# Patient Record
Sex: FEMALE | Race: WHITE | NOT HISPANIC OR LATINO | URBAN - METROPOLITAN AREA
[De-identification: names, ages, dates, MRNs, and addresses within clinical notes are randomized per-mention and may not be internally consistent; named-entity substitution may affect disease eponyms.]

---

## 2019-04-09 ENCOUNTER — OFFICE VISIT (OUTPATIENT)
Dept: FAMILY MEDICINE | Facility: CLINIC | Age: 29
End: 2019-04-09
Payer: COMMERCIAL

## 2019-04-09 VITALS
WEIGHT: 293 LBS | SYSTOLIC BLOOD PRESSURE: 119 MMHG | TEMPERATURE: 97.3 F | HEIGHT: 69 IN | OXYGEN SATURATION: 97 % | HEART RATE: 104 BPM | DIASTOLIC BLOOD PRESSURE: 85 MMHG | BODY MASS INDEX: 43.4 KG/M2

## 2019-04-09 DIAGNOSIS — Z12.4 CERVICAL CANCER SCREENING: ICD-10-CM

## 2019-04-09 DIAGNOSIS — R73.01 ELEVATED FASTING GLUCOSE: ICD-10-CM

## 2019-04-09 DIAGNOSIS — Z30.8 ENCOUNTER FOR OTHER CONTRACEPTIVE MANAGEMENT: Primary | ICD-10-CM

## 2019-04-09 DIAGNOSIS — E66.01 MORBID OBESITY (H): ICD-10-CM

## 2019-04-09 DIAGNOSIS — Z00.00 HEALTHCARE MAINTENANCE: ICD-10-CM

## 2019-04-09 DIAGNOSIS — F34.1 DYSTHYMIA: ICD-10-CM

## 2019-04-09 DIAGNOSIS — Z11.3 SCREEN FOR STD (SEXUALLY TRANSMITTED DISEASE): ICD-10-CM

## 2019-04-09 DIAGNOSIS — R00.0 TACHYCARDIA: ICD-10-CM

## 2019-04-09 LAB
ANION GAP SERPL CALCULATED.3IONS-SCNC: 8 MMOL/L (ref 3–14)
BUN SERPL-MCNC: 10 MG/DL (ref 7–30)
CALCIUM SERPL-MCNC: 9.2 MG/DL (ref 8.5–10.1)
CHLORIDE SERPL-SCNC: 107 MMOL/L (ref 94–109)
CHOLEST SERPL-MCNC: 184 MG/DL
CO2 SERPL-SCNC: 24 MMOL/L (ref 20–32)
CREAT SERPL-MCNC: 0.77 MG/DL (ref 0.52–1.04)
ERYTHROCYTE [DISTWIDTH] IN BLOOD BY AUTOMATED COUNT: 13.2 % (ref 10–15)
GFR SERPL CREATININE-BSD FRML MDRD: >90 ML/MIN/{1.73_M2}
GLUCOSE SERPL-MCNC: 127 MG/DL (ref 70–99)
HBA1C MFR BLD: 5.9 % (ref 0–5.6)
HCT VFR BLD AUTO: 43.5 % (ref 35–47)
HDLC SERPL-MCNC: 39 MG/DL
HGB BLD-MCNC: 14.3 G/DL (ref 11.7–15.7)
LDLC SERPL CALC-MCNC: 110 MG/DL
MCH RBC QN AUTO: 29.9 PG (ref 26.5–33)
MCHC RBC AUTO-ENTMCNC: 32.9 G/DL (ref 31.5–36.5)
MCV RBC AUTO: 91 FL (ref 78–100)
NONHDLC SERPL-MCNC: 145 MG/DL
PLATELET # BLD AUTO: 318 10E9/L (ref 150–450)
POTASSIUM SERPL-SCNC: 4.2 MMOL/L (ref 3.4–5.3)
RBC # BLD AUTO: 4.79 10E12/L (ref 3.8–5.2)
SODIUM SERPL-SCNC: 139 MMOL/L (ref 133–144)
TRIGL SERPL-MCNC: 177 MG/DL
TSH SERPL DL<=0.005 MIU/L-ACNC: 2.44 MU/L (ref 0.4–4)
WBC # BLD AUTO: 14.9 10E9/L (ref 4–11)

## 2019-04-09 PROCEDURE — G0145 SCR C/V CYTO,THINLAYER,RESCR: HCPCS | Performed by: NURSE PRACTITIONER

## 2019-04-09 PROCEDURE — 83036 HEMOGLOBIN GLYCOSYLATED A1C: CPT | Performed by: NURSE PRACTITIONER

## 2019-04-09 PROCEDURE — 36415 COLL VENOUS BLD VENIPUNCTURE: CPT | Performed by: NURSE PRACTITIONER

## 2019-04-09 PROCEDURE — 87491 CHLMYD TRACH DNA AMP PROBE: CPT | Performed by: NURSE PRACTITIONER

## 2019-04-09 PROCEDURE — 84443 ASSAY THYROID STIM HORMONE: CPT | Performed by: NURSE PRACTITIONER

## 2019-04-09 PROCEDURE — 80048 BASIC METABOLIC PNL TOTAL CA: CPT | Performed by: NURSE PRACTITIONER

## 2019-04-09 PROCEDURE — G0124 SCREEN C/V THIN LAYER BY MD: HCPCS | Performed by: NURSE PRACTITIONER

## 2019-04-09 PROCEDURE — 0064U ANTB TP TOTAL&RPR IA QUAL: CPT | Performed by: NURSE PRACTITIONER

## 2019-04-09 PROCEDURE — 87591 N.GONORRHOEAE DNA AMP PROB: CPT | Performed by: NURSE PRACTITIONER

## 2019-04-09 PROCEDURE — 80061 LIPID PANEL: CPT | Performed by: NURSE PRACTITIONER

## 2019-04-09 PROCEDURE — 85027 COMPLETE CBC AUTOMATED: CPT | Performed by: NURSE PRACTITIONER

## 2019-04-09 PROCEDURE — 87389 HIV-1 AG W/HIV-1&-2 AB AG IA: CPT | Performed by: NURSE PRACTITIONER

## 2019-04-09 PROCEDURE — 99203 OFFICE O/P NEW LOW 30 MIN: CPT | Performed by: NURSE PRACTITIONER

## 2019-04-09 ASSESSMENT — PAIN SCALES - GENERAL: PAINLEVEL: NO PAIN (0)

## 2019-04-09 ASSESSMENT — MIFFLIN-ST. JEOR: SCORE: 2288.31

## 2019-04-09 NOTE — LETTER
[facility]  [address] [place]  [address]  [phone]        April 11, 2019    Ricardo Sapp  [address] [place] [address]        Dear Ricardo,    The results of your recent labs are enclosed.   Your fasting blood sugar was elevated so I checked your A1c which gives us an average measure of your blood sugar over the past three months. This was elevated too consistent with pre-diabetes. It is important to avoid processed sugars, foods high in glycemic index and saturated fats and get regular exercise to reduce your risk of developing diabetes. We could start a medication called metformin which helps with insulin resistant. This medication can help reduce your risk of developing diabetes. Please let me know if this is something you're interested in.   STD screening was negative.   Thyroid function is within normal range.     Please call the clinic if you have any concerns.     Results for orders placed or performed in visit on 04/09/19   Basic metabolic panel   Result Value Ref Range    Sodium 139 133 - 144 mmol/L    Potassium 4.2 3.4 - 5.3 mmol/L    Chloride 107 94 - 109 mmol/L    Carbon Dioxide 24 20 - 32 mmol/L    Anion Gap 8 3 - 14 mmol/L    Glucose 127 (H) 70 - 99 mg/dL    Urea Nitrogen 10 7 - 30 mg/dL    Creatinine 0.77 0.52 - 1.04 mg/dL    GFR Estimate >90 >60 mL/min/[1.73_m2]    GFR Estimate If Black >90 >60 mL/min/[1.73_m2]    Calcium 9.2 8.5 - 10.1 mg/dL   Lipid panel reflex to direct LDL Fasting   Result Value Ref Range    Cholesterol 184 <200 mg/dL    Triglycerides 177 (H) <150 mg/dL    HDL Cholesterol 39 (L) >49 mg/dL    LDL Cholesterol Calculated 110 (H) <100 mg/dL    Non HDL Cholesterol 145 (H) <130 mg/dL   TSH with free T4 reflex   Result Value Ref Range    TSH 2.44 0.40 - 4.00 mU/L   HIV Antigen Antibody Combo   Result Value Ref Range    HIV Antigen Antibody Combo Nonreactive NR^Nonreactive       Treponema Abs w Reflex to RPR and Titer    Result Value Ref Range    Treponema Antibodies Nonreactive NR^Nonreactive   CBC with platelets   Result Value Ref Range    WBC 14.9 (H) 4.0 - 11.0 10e9/L    RBC Count 4.79 3.8 - 5.2 10e12/L    Hemoglobin 14.3 11.7 - 15.7 g/dL    Hematocrit 43.5 35.0 - 47.0 %    MCV 91 78 - 100 fl    MCH 29.9 26.5 - 33.0 pg    MCHC 32.9 31.5 - 36.5 g/dL    RDW 13.2 10.0 - 15.0 %    Platelet Count 318 150 - 450 10e9/L   Hemoglobin A1c   Result Value Ref Range    Hemoglobin A1C 5.9 (H) 0 - 5.6 %   NEISSERIA GONORRHOEA PCR   Result Value Ref Range    Specimen Descrip Endocervical     N Gonorrhea PCR Negative NEG^Negative   CHLAMYDIA TRACHOMATIS PCR   Result Value Ref Range    Specimen Description Endocervical     Chlamydia Trachomatis PCR Negative NEG^Negative       If you have any questions please call the clinic at [phone].    Sincerely,    Radha CROW CNP  LMD

## 2019-04-09 NOTE — PATIENT INSTRUCTIONS
You can schedule with our midwives to have your IUD removed  They are here on Thursdays  If Thursdays don't work for you, you can see OB/GYN or midwives at [facility]

## 2019-04-09 NOTE — PROGRESS NOTES
"  SUBJECTIVE:                                                    Ricardo Sapp is a 28 year old female who presents to clinic today for the following health issues:      Patient is here today to discuss birth control options, currently she has an IUD in that needs to come out soon.    She recently moved from [facility] IUD inserted May 2016  She was  3 years ago  Might be interested in getting pregnant within next 1-2 years  Not getting period on IUD  Light, occasional spotting, more recently    Was on Trudy in the past    Seeing a therapist for depression  Working well  Hx dysthymia    Denies other PMH  No medications  Declines STD screening        Problem list and histories reviewed & adjusted, as indicated.  Additional history: as documented    Patient Active Problem List   Diagnosis     overweight HCC     Dysthymia     History reviewed. No pertinent surgical history.    Social History     Tobacco Use     Smoking status: Never Smoker     Smokeless tobacco: Never Used   Substance Use Topics     Alcohol use: Not Currently     Family History   Problem Relation Age of Onset     Hypertension Mother      Diabetes Father      Hypertension Father            ROS:  Constitutional, HEENT, cardiovascular, pulmonary, gi and gu systems are negative, except as otherwise noted.    OBJECTIVE:     /85 (BP Location: Right arm, Patient Position: Chair, Cuff Size: Adult Large)   Pulse 104   Temp 97.3  F (36.3  C) (Oral)   Ht 1.74 m (5' 8.5\")   Wt 149.7 kg (330 lb)   SpO2 97%   Breastfeeding? No   BMI 49.45 kg/m    Body mass index is 49.45 kg/m .  GENERAL: healthy, alert and no distress  NECK: no adenopathy, no asymmetry, masses, or scars and thyroid normal to palpation  RESP: lungs clear to auscultation - no rales, rhonchi or wheezes  CV: regular rate and rhythm, normal S1 S2, no S3 or S4, no murmur, click or rub, no peripheral edema and peripheral pulses strong  ABDOMEN: soft, nontender, no " hepatosplenomegaly, no masses and bowel sounds normal   (female): normal female external genitalia, normal urethral meatus, vaginal mucosa, normal cervix with IUD strings visualized protruding from os    Diagnostic Test Results:  none     ASSESSMENT/PLAN:       ICD-10-CM    1. Encounter for other contraceptive management Z30.8    2. overweight HCC E66.01    3. Screen for STD (sexually transmitted disease) Z11.3 NEISSERIA GONORRHOEA PCR     CHLAMYDIA TRACHOMATIS PCR     HIV Antigen Antibody Combo     Treponema Abs w Reflex to RPR and Titer   4. Tachycardia R00.0 TSH with free T4 reflex     CBC with platelets   5. Cervical cancer screening Z12.4 Pap imaged thin layer screen reflex to HPV if ASCUS - recommend age 25 - 29   6. Healthcare maintenance Z00.00 Basic metabolic panel     Lipid panel reflex to direct LDL Fasting   7. Dysthymia F34.1      Discussed alternative options for birth control. She is interested in COCs. Would recommend a lower dose hormone than Trudy. She will schedule with midwife for IUD removal next month  Baseline labs done today  Discussed dietary and exercise guidelines      Patient Instructions   You can schedule with our midwives to have your IUD removed  They are here on Thursdays  If Thursdays don't work for you, you can see OB/GYN or midwives at [place] on [address]        TRISTIN Yao [facility]

## 2019-04-09 NOTE — LETTER
April 24, 2019      Ricardo Sapp  [address] [facility] [address]    Dear ,      I am happy to inform you that your recent cervical cancer screening test (PAP smear) was normal.      The presence of Actinomyces was seen on your pap smear. Actinomyces is present in normal vaginal jb. It is fairly common in women with a IUD, as it likes to grow on the strings of the IUD.  As long as you are not having any discomfort, pelvic pain, discharge, abdominal pain, fever, we are okay to monitor. If you develop any of these symptoms, please call to  schedule an appt with an OBGYN provider for further evaluation.    Preventative screenings such as this help to ensure your health for years to come. You should repeat a pap smear in 3 years, unless otherwise directed.      You will still need to return to the clinic every year for your annual exam and other preventive tests.     If you have additional questions regarding this result, please call our registered nurse, Cici at [phone].      Sincerely,      TRISTIN aYo CNP/cmc

## 2019-04-10 LAB
C TRACH DNA SPEC QL NAA+PROBE: NEGATIVE
HIV 1+2 AB+HIV1 P24 AG SERPL QL IA: NONREACTIVE
N GONORRHOEA DNA SPEC QL NAA+PROBE: NEGATIVE
SPECIMEN SOURCE: NORMAL
SPECIMEN SOURCE: NORMAL
T PALLIDUM AB SER QL: NONREACTIVE

## 2019-04-11 ENCOUNTER — OFFICE VISIT (OUTPATIENT)
Dept: MIDWIFE SERVICES | Facility: CLINIC | Age: 29
End: 2019-04-11
Payer: COMMERCIAL

## 2019-04-11 VITALS
HEART RATE: 104 BPM | TEMPERATURE: 97.2 F | BODY MASS INDEX: 49.45 KG/M2 | SYSTOLIC BLOOD PRESSURE: 120 MMHG | DIASTOLIC BLOOD PRESSURE: 85 MMHG | WEIGHT: 293 LBS

## 2019-04-11 DIAGNOSIS — Z30.432 ENCOUNTER FOR IUD REMOVAL: ICD-10-CM

## 2019-04-11 DIAGNOSIS — Z30.430 ENCOUNTER FOR IUD INSERTION: Primary | ICD-10-CM

## 2019-04-11 PROCEDURE — 58300 INSERT INTRAUTERINE DEVICE: CPT | Performed by: ADVANCED PRACTICE MIDWIFE

## 2019-04-11 PROCEDURE — 58301 REMOVE INTRAUTERINE DEVICE: CPT | Performed by: ADVANCED PRACTICE MIDWIFE

## 2019-04-11 NOTE — NURSING NOTE
"Chief Complaint   Patient presents with     IUD     removal     Yanet NDC:99153-896-84 Lot:[id number] EXP:1/2021  Initial /85 (BP Location: Right arm, Patient Position: Sitting, Cuff Size: Adult Large)   Pulse 104   Temp 97.2  F (36.2  C) (Oral)   Wt 149.7 kg (330 lb)   BMI 49.45 kg/m   Estimated body mass index is 49.45 kg/m  as calculated from the following:    Height as of 4/9/19: 1.74 m (5' 8.5\").    Weight as of this encounter: 149.7 kg (330 lb).  BP completed using cuff size: large    Questioned patient about current smoking habits.  Pt. has never smoked.      No obstetric history on file.    The following HM Due: pap smear      The following patient reported/Care Every where data was sent to:  P ABSTRACT QUALITY INITIATIVES [60841]  KARUNA Vela           "

## 2019-04-11 NOTE — RESULT ENCOUNTER NOTE
[address] [address]  Phone: [phone]  Fax: [phone]      04/11/19    Ricardo Sapp  [address] [facility] [address]      Dear Ricardo,    The results of your recent labs are enclosed.  Your fasting blood sugar was elevated so I checked your A1c which gives us an average measure of your blood sugar over the past three months. This was elevated too consistent with pre-diabetes. It is important to avoid processed sugars, foods high in glycemic index and saturated fats and get regular exercise to reduce your risk of developing diabetes. We could start a medication called metformin which helps with insulin resistant. This medication can help reduce your risk of developing diabetes. Please let me know if this is something you're interested in.   STD screening was negative.  Thyroid function is within normal range.     Please call the clinic if you have any concerns.    Sincerely,    TRISTIN Yao CNP    Your [facility]3] Care Team

## 2019-04-12 NOTE — PROGRESS NOTES
Chief Complaint/ History of Present Illness:Ricardo Sapp is a 28 year old  female.   No LMP recorded. (Menstrual status: IUD)..  Today's pregnancy test negative.  She is here for an IUD insertion.  Patient has been given written information.  I have reviewed the risks of the IUD including pregnancy, PID, life threatening infection, perforation, expulsion, cramping, changes in bleeding and ovarian cysts. Benefits of the IUD and alternative family planning methods have been discussed.  Patients questions are answered.  Patient has verbalized understanding of risks and benefits and has signed the consent form.    No Known Allergies  Current Outpatient Medications   Medication Sig Dispense Refill     levonorgestrel (YANET) 13.5 MG IUD 1 each (13.5 mg) by Intrauterine route once for 1 dose 1 each 0      No past medical history on file.  No past surgical history on file.    REVIEW OF SYSTEMS  CONSTITUTIONAL: Denies fever, chills and night sweats  BREASTS:  Denies discharge and lumps.    HEART/LUNGS: Denies dyspnea, wheezing, coughing and chest pain.  HEMATOLOGIC: Denies tendency to bruise and history of blood clots.  GENITOURINARY:  Denies urgency, dysuria and hematuria.  NEUROLOGIC:  Denies seizures, weakness and fainting.  PSYCHIATRIC: Negative for changes in mood or affect      EXAM:  /85 (BP Location: Right arm, Patient Position: Sitting, Cuff Size: Adult Large)   Pulse 104   Temp 97.2  F (36.2  C) (Oral)   Wt 149.7 kg (330 lb)   BMI 49.45 kg/m      Abdomen: soft, nontender, without hepatosplenomegaly or masses  : normal cervix, adnexae, and uterus without masses or discharge  IUD type: Yanet  Lot # [id number]    Procedure:  Uterus assessed for position and is anteverted.  Speculum inserted. Ring forceps used to grasp strings and light traction applied. IUD removed without complication. Betadine prep of cervix done.  Tenaculum applied at 12 o'clock position and gentle traction was applied to  elongate the cervical canal.  Uterus sounded to 7.5cm's.  Cervical dilators not used.   IUD inserted in the usual fashion without problems, ie: resistance, severe protracted pain or excessive bleeding.  Tenaculum was removed with scant bleeding from the tenaculum site that was managed with some direct pressure using Nicholson swabs.  Strings trimmed to 3 cm's.  Patient tolerated the procedure well without any prolonged pain or syncopy.      ASSESSMENT/ PLAN:  (Z30.661) Encounter for IUD insertion  (primary encounter diagnosis)  Comment:   Plan: levonorgestrel (RAFFAELE) 13.5 MG IUD, INSERTION         INTRAUTERINE DEVICE           (Z30.108) Encounter for IUD removal  Comment:   Plan: REMOVE INTRAUTERINE DEVICE          Instructions given to patient regarding checking IUD strings, returning to the clinic if pain or inability to check strings and/or irregular bleeding.  Enedina Hammonds CNM

## 2019-04-13 LAB
COPATH REPORT: NORMAL
PAP: NORMAL

## 2019-04-17 NOTE — RESULT ENCOUNTER NOTE
I consulted with our midwife, TRISTIN Fonseca. Patient had negative G/C testing, no cervical motion tenderness, or other symptoms of PID. No further evaluation is needed. Please let her know to return to clinic if she develops signs of PID such as pelvic/vaginal pain, pain with intercourse, abnormal vaginal discharge, etc.

## 2020-03-11 ENCOUNTER — HEALTH MAINTENANCE LETTER (OUTPATIENT)
Age: 30
End: 2020-03-11

## 2021-01-03 ENCOUNTER — HEALTH MAINTENANCE LETTER (OUTPATIENT)
Age: 31
End: 2021-01-03

## 2021-04-25 ENCOUNTER — HEALTH MAINTENANCE LETTER (OUTPATIENT)
Age: 31
End: 2021-04-25

## 2021-10-10 ENCOUNTER — HEALTH MAINTENANCE LETTER (OUTPATIENT)
Age: 31
End: 2021-10-10

## 2022-05-21 ENCOUNTER — HEALTH MAINTENANCE LETTER (OUTPATIENT)
Age: 32
End: 2022-05-21

## 2022-09-18 ENCOUNTER — HEALTH MAINTENANCE LETTER (OUTPATIENT)
Age: 32
End: 2022-09-18

## 2022-09-20 ENCOUNTER — MEDICAL CORRESPONDENCE (OUTPATIENT)
Dept: HEALTH INFORMATION MANAGEMENT | Facility: CLINIC | Age: 32
End: 2022-09-20

## 2022-09-20 ENCOUNTER — TRANSFERRED RECORDS (OUTPATIENT)
Dept: HEALTH INFORMATION MANAGEMENT | Facility: CLINIC | Age: 32
End: 2022-09-20

## 2022-10-03 ENCOUNTER — TELEPHONE (OUTPATIENT)
Dept: CARDIOLOGY | Facility: CLINIC | Age: 32
End: 2022-10-03

## 2022-10-03 NOTE — TELEPHONE ENCOUNTER
Confirmed and scheduled for 10/20 at 2:00PM with Dr. Garrett. Patient and I discussed appointment details, location and arrival time    [facility]    [phone]

## 2022-10-20 ENCOUNTER — OFFICE VISIT (OUTPATIENT)
Dept: CARDIOLOGY | Facility: CLINIC | Age: 32
End: 2022-10-20
Payer: COMMERCIAL

## 2022-10-20 ENCOUNTER — HOSPITAL ENCOUNTER (OUTPATIENT)
Dept: CARDIOLOGY | Facility: CLINIC | Age: 32
Discharge: HOME | End: 2022-10-20
Payer: COMMERCIAL

## 2022-10-20 DIAGNOSIS — E66.9 DIABETES MELLITUS TYPE 2 IN OBESE: Primary | ICD-10-CM

## 2022-10-20 DIAGNOSIS — E11.69 DIABETES MELLITUS TYPE 2 IN OBESE: ICD-10-CM

## 2022-10-20 DIAGNOSIS — E66.9 DIABETES MELLITUS TYPE 2 IN OBESE: ICD-10-CM

## 2022-10-20 DIAGNOSIS — E11.69 DIABETES MELLITUS TYPE 2 IN OBESE: Primary | ICD-10-CM

## 2022-10-20 PROCEDURE — 76825 ECHO EXAM OF FETAL HEART: CPT | Mod: 26 | Performed by: PEDIATRICS

## 2022-10-20 PROCEDURE — 99204 OFFICE O/P NEW MOD 45 MIN: CPT | Performed by: PEDIATRICS

## 2022-10-20 PROCEDURE — 93325 DOPPLER ECHO COLOR FLOW MAPG: CPT | Mod: 26 | Performed by: PEDIATRICS

## 2022-10-20 PROCEDURE — 76827 ECHO EXAM OF FETAL HEART: CPT | Mod: 26 | Performed by: PEDIATRICS

## 2022-10-20 PROCEDURE — 93325 DOPPLER ECHO COLOR FLOW MAPG: CPT

## 2022-10-20 NOTE — PROGRESS NOTES
Fetal Cardiology Consultation    Patient:  Ricardo Sapp MRN:  [id number]   YOB: 1990 Age:  32 year old   Date of Visit:  10/20/2022 PCP:  Germaine Glover Macclenny   SUSIE: 2/11/23 EGA: 23+5 weeks     Dear Dr. Barker:    I had the pleasure of seeing Ricardo Sapp at the [facility] in [place] on 10/20/2022 in consultation for fetal echocardiography results. She presented today by herself. As you know, she is a 32 year old female with T2DM.    The fetal echocardiogram was technically challenging. Likely normal fetal echocardiogram. Normal fetal cardiac anatomy. The aortic isthmus is incompletely seen in a ductal view, likely normal; no indirect evidence of coarctation. Normal right and left ventricular size and function without hypertrophy. No evidence of diastolic dysfunction. No pericardial effusion. No arrhythmia.     I reviewed and interpreted the fetal echocardiogram today. I discussed the normal results with Ms. Sapp. While these results are normal, it is important to note that fetal echocardiography cannot exclude small atrial or ventricular septal defects, persistent ductus arteriosus, mild coarctation of the aorta, partial anomalous pulmonary venous return, minor anatomic valve anomalies, or coronary artery anomalies.     Thank you for allowing me to participate in Ms. Sapp's care. Please don't hesitate to contact me or the Fetal Cardiology team at [facility] with any questions or concerns.     I spent a total of 40 minutes on the date of the encounter doing chart review, patient history, documentation, counseling, and coordinating care.    Mayank Garrett MD  Pediatric Cardiology  [facility]  Phone [phone]    Review of prior external note(s) from - Outside records from [facility]  Review of the result(s) of each unique test -  fetal echocardiogram

## 2023-01-29 ENCOUNTER — HEALTH MAINTENANCE LETTER (OUTPATIENT)
Age: 33
End: 2023-01-29

## 2023-05-07 ENCOUNTER — HEALTH MAINTENANCE LETTER (OUTPATIENT)
Age: 33
End: 2023-05-07

## 2023-06-04 ENCOUNTER — HEALTH MAINTENANCE LETTER (OUTPATIENT)
Age: 33
End: 2023-06-04

## 2023-10-08 ENCOUNTER — HEALTH MAINTENANCE LETTER (OUTPATIENT)
Age: 33
End: 2023-10-08

## 2024-02-25 ENCOUNTER — HEALTH MAINTENANCE LETTER (OUTPATIENT)
Age: 34
End: 2024-02-25

## 2024-07-14 ENCOUNTER — HEALTH MAINTENANCE LETTER (OUTPATIENT)
Age: 34
End: 2024-07-14

## 2024-12-01 ENCOUNTER — HEALTH MAINTENANCE LETTER (OUTPATIENT)
Age: 34
End: 2024-12-01

## 2025-03-15 ENCOUNTER — HEALTH MAINTENANCE LETTER (OUTPATIENT)
Age: 35
End: 2025-03-15

## 2025-06-28 ENCOUNTER — HEALTH MAINTENANCE LETTER (OUTPATIENT)
Age: 35
End: 2025-06-28

## 2025-07-19 ENCOUNTER — HEALTH MAINTENANCE LETTER (OUTPATIENT)
Age: 35
End: 2025-07-19